# Patient Record
Sex: MALE | Employment: FULL TIME | URBAN - METROPOLITAN AREA
[De-identification: names, ages, dates, MRNs, and addresses within clinical notes are randomized per-mention and may not be internally consistent; named-entity substitution may affect disease eponyms.]

---

## 2024-09-11 ENCOUNTER — HOSPITAL ENCOUNTER (OUTPATIENT)
Dept: RADIOLOGY | Facility: HOSPITAL | Age: 24
Discharge: HOME | End: 2024-09-11
Payer: COMMERCIAL

## 2024-09-11 ENCOUNTER — OFFICE VISIT (OUTPATIENT)
Dept: ORTHOPEDIC SURGERY | Facility: HOSPITAL | Age: 24
End: 2024-09-11
Payer: COMMERCIAL

## 2024-09-11 ENCOUNTER — LAB (OUTPATIENT)
Dept: LAB | Facility: LAB | Age: 24
End: 2024-09-11
Payer: COMMERCIAL

## 2024-09-11 VITALS
HEIGHT: 74 IN | SYSTOLIC BLOOD PRESSURE: 129 MMHG | HEART RATE: 60 BPM | WEIGHT: 226 LBS | BODY MASS INDEX: 29 KG/M2 | DIASTOLIC BLOOD PRESSURE: 79 MMHG

## 2024-09-11 DIAGNOSIS — Z88.0 ALLERGY TO PENICILLIN: ICD-10-CM

## 2024-09-11 DIAGNOSIS — Z02.5 SPORTS PHYSICAL: Primary | ICD-10-CM

## 2024-09-11 DIAGNOSIS — Z02.5 SPORTS PHYSICAL: ICD-10-CM

## 2024-09-11 DIAGNOSIS — Z86.59 HISTORY OF ADHD: ICD-10-CM

## 2024-09-11 LAB
25(OH)D3 SERPL-MCNC: 40 NG/ML (ref 30–100)
ABO GROUP (TYPE) IN BLOOD: NORMAL
ALBUMIN SERPL BCP-MCNC: 4 G/DL (ref 3.4–5)
ALP SERPL-CCNC: 65 U/L (ref 33–120)
ALT SERPL W P-5'-P-CCNC: 19 U/L (ref 10–52)
ANION GAP SERPL CALC-SCNC: 10 MMOL/L (ref 10–20)
ANTIBODY SCREEN: NORMAL
APPEARANCE UR: CLEAR
AST SERPL W P-5'-P-CCNC: 21 U/L (ref 9–39)
BASOPHILS # BLD AUTO: 0.03 X10*3/UL (ref 0–0.1)
BASOPHILS NFR BLD AUTO: 0.7 %
BILIRUB SERPL-MCNC: 1.5 MG/DL (ref 0–1.2)
BILIRUB UR STRIP.AUTO-MCNC: NEGATIVE MG/DL
BUN SERPL-MCNC: 15 MG/DL (ref 6–23)
CALCIUM SERPL-MCNC: 9.5 MG/DL (ref 8.6–10.3)
CHLORIDE SERPL-SCNC: 103 MMOL/L (ref 98–107)
CHOLEST SERPL-MCNC: 90 MG/DL (ref 0–199)
CHOLESTEROL/HDL RATIO: 2.1
CO2 SERPL-SCNC: 29 MMOL/L (ref 21–32)
COLOR UR: YELLOW
CREAT SERPL-MCNC: 1.07 MG/DL (ref 0.5–1.3)
EGFRCR SERPLBLD CKD-EPI 2021: >90 ML/MIN/1.73M*2
EOSINOPHIL # BLD AUTO: 0.14 X10*3/UL (ref 0–0.7)
EOSINOPHIL NFR BLD AUTO: 3.2 %
ERYTHROCYTE [DISTWIDTH] IN BLOOD BY AUTOMATED COUNT: 11 % (ref 11.5–14.5)
FERRITIN SERPL-MCNC: 236 NG/ML (ref 20–300)
GLUCOSE SERPL-MCNC: 92 MG/DL (ref 74–99)
GLUCOSE UR STRIP.AUTO-MCNC: NORMAL MG/DL
HCT VFR BLD AUTO: 41 % (ref 41–52)
HCV AB SER QL: NONREACTIVE
HDLC SERPL-MCNC: 42.2 MG/DL
HGB BLD-MCNC: 13.4 G/DL (ref 13.5–17.5)
IMM GRANULOCYTES # BLD AUTO: 0.01 X10*3/UL (ref 0–0.7)
IMM GRANULOCYTES NFR BLD AUTO: 0.2 % (ref 0–0.9)
KETONES UR STRIP.AUTO-MCNC: NEGATIVE MG/DL
LDLC SERPL CALC-MCNC: 36 MG/DL
LEUKOCYTE ESTERASE UR QL STRIP.AUTO: NEGATIVE
LYMPHOCYTES # BLD AUTO: 2.25 X10*3/UL (ref 1.2–4.8)
LYMPHOCYTES NFR BLD AUTO: 51.1 %
MCH RBC QN AUTO: 30 PG (ref 26–34)
MCHC RBC AUTO-ENTMCNC: 32.7 G/DL (ref 32–36)
MCV RBC AUTO: 92 FL (ref 80–100)
MONOCYTES # BLD AUTO: 0.38 X10*3/UL (ref 0.1–1)
MONOCYTES NFR BLD AUTO: 8.6 %
MUCOUS THREADS #/AREA URNS AUTO: NORMAL /LPF
NEUTROPHILS # BLD AUTO: 1.59 X10*3/UL (ref 1.2–7.7)
NEUTROPHILS NFR BLD AUTO: 36.2 %
NITRITE UR QL STRIP.AUTO: NEGATIVE
NON HDL CHOLESTEROL: 48 MG/DL (ref 0–149)
NRBC BLD-RTO: 0 /100 WBCS (ref 0–0)
PH UR STRIP.AUTO: 6 [PH]
PLATELET # BLD AUTO: 296 X10*3/UL (ref 150–450)
POTASSIUM SERPL-SCNC: 4 MMOL/L (ref 3.5–5.3)
PROT SERPL-MCNC: 7.3 G/DL (ref 6.4–8.2)
PROT UR STRIP.AUTO-MCNC: NORMAL MG/DL
RBC # BLD AUTO: 4.47 X10*6/UL (ref 4.5–5.9)
RBC # UR STRIP.AUTO: NEGATIVE /UL
RBC #/AREA URNS AUTO: NORMAL /HPF
RH FACTOR (ANTIGEN D): NORMAL
SODIUM SERPL-SCNC: 138 MMOL/L (ref 136–145)
SP GR UR STRIP.AUTO: 1.03
TRIGL SERPL-MCNC: 61 MG/DL (ref 0–149)
TSH SERPL-ACNC: 0.72 MIU/L (ref 0.44–3.98)
URATE SERPL-MCNC: 4.3 MG/DL (ref 4–7.5)
UROBILINOGEN UR STRIP.AUTO-MCNC: NORMAL MG/DL
VLDL: 12 MG/DL (ref 0–40)
WBC # BLD AUTO: 4.4 X10*3/UL (ref 4.4–11.3)
WBC #/AREA URNS AUTO: NORMAL /HPF

## 2024-09-11 PROCEDURE — 81001 URINALYSIS AUTO W/SCOPE: CPT | Mod: NFL

## 2024-09-11 PROCEDURE — 83020 HEMOGLOBIN ELECTROPHORESIS: CPT | Mod: NFL | Performed by: FAMILY MEDICINE

## 2024-09-11 PROCEDURE — 99205 OFFICE O/P NEW HI 60 MIN: CPT | Performed by: FAMILY MEDICINE

## 2024-09-11 PROCEDURE — 86850 RBC ANTIBODY SCREEN: CPT | Mod: NFL

## 2024-09-11 PROCEDURE — 85025 COMPLETE CBC W/AUTO DIFF WBC: CPT | Mod: NFL

## 2024-09-11 PROCEDURE — 80061 LIPID PANEL: CPT | Mod: NFL

## 2024-09-11 PROCEDURE — 71046 X-RAY EXAM CHEST 2 VIEWS: CPT | Mod: 398

## 2024-09-11 PROCEDURE — 86901 BLOOD TYPING SEROLOGIC RH(D): CPT | Mod: NFL

## 2024-09-11 PROCEDURE — 73564 X-RAY EXAM KNEE 4 OR MORE: CPT | Mod: RT,398

## 2024-09-11 PROCEDURE — 3008F BODY MASS INDEX DOCD: CPT | Performed by: FAMILY MEDICINE

## 2024-09-11 PROCEDURE — 84443 ASSAY THYROID STIM HORMONE: CPT | Mod: NFL

## 2024-09-11 PROCEDURE — 86900 BLOOD TYPING SEROLOGIC ABO: CPT | Mod: NFL

## 2024-09-11 PROCEDURE — 36415 COLL VENOUS BLD VENIPUNCTURE: CPT

## 2024-09-11 PROCEDURE — 99215 OFFICE O/P EST HI 40 MIN: CPT | Performed by: FAMILY MEDICINE

## 2024-09-11 PROCEDURE — 84550 ASSAY OF BLOOD/URIC ACID: CPT | Mod: NFL

## 2024-09-11 PROCEDURE — 80053 COMPREHEN METABOLIC PANEL: CPT | Mod: NFL

## 2024-09-11 PROCEDURE — 82955 ASSAY OF G6PD ENZYME: CPT | Mod: NFL

## 2024-09-11 NOTE — PROGRESS NOTES
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 He denies any significant past medical history including heat illness. He reports no previous concussions. He has no history of asthma. He has history of ADHD but has not required medication since 2020.  He has no history of migraines. He has PCN allergy but does not recall reaction. He takes no prescription medications. He does not use creatine supplementation.    He has no other complaints today and no interval health history changes since last physical.     Exam:  CONSTITUTIONAL  General appearance: Alert and in no acute distress. Well developed, well nourished.   HEAD AND FACE  Head and face: Normal.    EYES  External Eye, Conjunctiva and Lids: Normal external exam - pupils were equal in size, round, reactive to light (PERRL) with normal accommodation and extraocular movements intact (EOMI).    Pupils and irises: Normal.    EARS, NOSE, MOUTH, AND THROAT  External inspection of ears and nose: Normal.     Hearing: Normal.     Nasal mucosa, septum, and turbinates: Normal.     Lips, teeth, and gums: Normal.     Oropharynx: Normal.    NECK  Neck: No neck mass was observed. Supple.    Thyroid: Not enlarged and there were no palpable thyroid nodules.   PULMONARY  Respiratory effort: No respiratory distress.    Auscultation of lungs: Clear bilateral breath sounds.   CARDIOVASCULAR  Auscultation of heart: Heart rate and rhythm were normal, normal S1 and S2, no gallops, no murmurs and no pericardial rub.    Femoral pulses: Normal.     Pedal pulses: Normal.    Peripheral vascular exam: Normal.     Examination of extremities: No peripheral edema.   ABDOMEN  Abdomen: Normal bowel sounds, soft nontender; no abdominal mass palpated. No rebound, rigidity or guarding.    Liver and spleen: No hepatosplenomegaly.    GENITOURINARY  Deferred by patient, to include hernia exam.  LYMPHATIC  Palpation of lymph nodes in neck: No lymphadenopathy.    MUSCULOSKELETAL  Gait and station: Normal.    Digits and nails: No clubbing or cyanosis of the fingernails.    Inspection/palpation of joints, bones, and muscles: No joint swelling seen, normal movements of all extremities.    Range of motion: Normal.     Stability: Normal.     Muscle strength/tone: Normal.    SKIN  Skin and subcutaneous tissue: Normal skin color and pigmentation, normal skin turgor, and no rash.    Palpation of skin and subcutaneous tissue: Normal.    NEUROLOGIC  Cranial nerves: 2-12 grossly intact.    Cortical function: Normal.     Sensation: Normal.     Coordination: Normal.    PSYCHIATRIC  Judgment and insight: Intact.    Orientation to person, place, and time: Alert and oriented x 3.    Recent and remote memory: Normal.     Mood and affect: Normal.      Results:  Chest x-ray: two-view chest x-ray obtained today are without acute abnormality.       EKG: Normal sinus rhythm, no other abnormalities.    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 He denies any significant past medical history including heat illness. He reports no previous concussions. He has no history of asthma. He has history of ADHD but has not required medication since 2020.  He has no history of migraines. He has PCN allergy but does not recall reaction. He takes no prescription medications. He does not use creatine supplementation.    He was found to have a normal physical exam, EKG and chest x-ray today. He needs no further evaluation and is cleared for full participation.

## 2024-09-12 LAB
G6PD RBC-CCNT: 1.8 U/G HB (ref 9.9–16.6)
HEMOGLOBIN A2: 3 % (ref 2–3.5)
HEMOGLOBIN A: 96.9 % (ref 95.8–98)
HEMOGLOBIN F: 0.1 % (ref 0–2)
HEMOGLOBIN IDENTIFICATION INTERPRETATION: NORMAL
PATH REVIEW-HGB IDENTIFICATION: NORMAL